# Patient Record
Sex: FEMALE | Race: WHITE | URBAN - METROPOLITAN AREA
[De-identification: names, ages, dates, MRNs, and addresses within clinical notes are randomized per-mention and may not be internally consistent; named-entity substitution may affect disease eponyms.]

---

## 2023-02-06 ENCOUNTER — TELEPHONE (OUTPATIENT)
Dept: CARDIOLOGY CLINIC | Facility: CLINIC | Age: 38
End: 2023-02-06

## 2023-02-06 NOTE — TELEPHONE ENCOUNTER
Hi, it's Young Cheema from [facility] calling in reference to mutual patient [address] [facility] [place], [place] [id number]  We had requested records  We didn't get them  I spoke to [facility]  They will not be able to get them to us by tomorrow because somebody there, let's send us last office note, EKG and blood work and any echoes or cardiac testing that she may have had and we will still put another request into [facility]  Our phone number here is [phone], option 6  Our fax number is [phone]  If you could call us either way so that we know that the records are coming, the patient is coming new to us tomorrow and the doctor would like [facility]  Thank you      Called and left message pt hasn't been seen at our office, unable to fax records